# Patient Record
Sex: FEMALE | Race: WHITE | URBAN - METROPOLITAN AREA
[De-identification: names, ages, dates, MRNs, and addresses within clinical notes are randomized per-mention and may not be internally consistent; named-entity substitution may affect disease eponyms.]

---

## 2020-08-24 ENCOUNTER — TELEPHONE (OUTPATIENT)
Dept: ORTHOPEDIC SURGERY | Age: 25
End: 2020-08-24

## 2020-08-28 ENCOUNTER — OFFICE VISIT (OUTPATIENT)
Dept: ORTHOPEDIC SURGERY | Age: 25
End: 2020-08-28
Payer: COMMERCIAL

## 2020-08-28 VITALS — WEIGHT: 178 LBS | HEIGHT: 61 IN | TEMPERATURE: 98.7 F | BODY MASS INDEX: 33.61 KG/M2

## 2020-08-28 PROBLEM — M51.26 HERNIATION OF INTERVERTEBRAL DISC BETWEEN L4 AND L5: Status: ACTIVE | Noted: 2020-08-28

## 2020-08-28 PROBLEM — J45.20 MILD INTERMITTENT ASTHMA: Status: ACTIVE | Noted: 2017-08-08

## 2020-08-28 PROBLEM — M25.562 ACUTE PAIN OF LEFT KNEE: Status: ACTIVE | Noted: 2020-08-27

## 2020-08-28 PROBLEM — E03.8 SUBCLINICAL HYPOTHYROIDISM: Status: ACTIVE | Noted: 2020-02-11

## 2020-08-28 PROBLEM — Z80.41 FH: OVARIAN CANCER IN FIRST DEGREE RELATIVE: Status: ACTIVE | Noted: 2020-08-28

## 2020-08-28 PROBLEM — E66.9 OBESITY (BMI 35.0-39.9 WITHOUT COMORBIDITY): Status: ACTIVE | Noted: 2020-02-24

## 2020-08-28 PROBLEM — Z34.92 SECOND TRIMESTER PREGNANCY: Status: ACTIVE | Noted: 2020-06-30

## 2020-08-28 PROBLEM — J06.9 UPPER RESPIRATORY INFECTION: Status: ACTIVE | Noted: 2020-03-12

## 2020-08-28 PROBLEM — J32.0 CHRONIC MAXILLARY SINUSITIS: Status: ACTIVE | Noted: 2019-04-16

## 2020-08-28 PROBLEM — O99.210 OBESITY AFFECTING PREGNANCY, ANTEPARTUM: Status: ACTIVE | Noted: 2020-02-11

## 2020-08-28 PROBLEM — Z80.3: Status: ACTIVE | Noted: 2020-08-28

## 2020-08-28 PROBLEM — Z84.81 FAMILY HISTORY OF BRCA GENE POSITIVE: Status: ACTIVE | Noted: 2020-08-28

## 2020-08-28 PROCEDURE — E0135 WALKER FOLDING ADJUST/FIXED: HCPCS | Performed by: ORTHOPAEDIC SURGERY

## 2020-08-28 PROCEDURE — G8419 CALC BMI OUT NRM PARAM NOF/U: HCPCS | Performed by: ORTHOPAEDIC SURGERY

## 2020-08-28 PROCEDURE — G8427 DOCREV CUR MEDS BY ELIG CLIN: HCPCS | Performed by: ORTHOPAEDIC SURGERY

## 2020-08-28 PROCEDURE — 99204 OFFICE O/P NEW MOD 45 MIN: CPT | Performed by: ORTHOPAEDIC SURGERY

## 2020-08-28 PROCEDURE — 1036F TOBACCO NON-USER: CPT | Performed by: ORTHOPAEDIC SURGERY

## 2020-08-28 RX ORDER — FLUTICASONE PROPIONATE 50 MCG
2 SPRAY, SUSPENSION (ML) NASAL DAILY
COMMUNITY

## 2020-08-28 RX ORDER — ALBUTEROL SULFATE 90 UG/1
1 AEROSOL, METERED RESPIRATORY (INHALATION) EVERY 6 HOURS PRN
COMMUNITY
Start: 2019-01-23

## 2020-08-28 RX ORDER — ONDANSETRON 4 MG/1
4 TABLET, FILM COATED ORAL EVERY 8 HOURS PRN
COMMUNITY
Start: 2020-06-03 | End: 2021-06-03

## 2020-08-28 RX ORDER — ONDANSETRON 4 MG/1
TABLET, FILM COATED ORAL
COMMUNITY
Start: 2020-06-03

## 2020-08-28 RX ORDER — LORATADINE 10 MG/1
10 TABLET ORAL DAILY
COMMUNITY
Start: 2019-02-21

## 2020-08-28 RX ORDER — FAMOTIDINE 20 MG/1
20 TABLET, FILM COATED ORAL DAILY
COMMUNITY
Start: 2020-02-11 | End: 2021-02-10

## 2020-08-28 RX ORDER — FAMOTIDINE 20 MG/1
TABLET ORAL
COMMUNITY
Start: 2020-07-28

## 2020-08-28 RX ORDER — DIPHENHYDRAMINE HYDROCHLORIDE 25 MG/1
TABLET, CHEWABLE ORAL
COMMUNITY

## 2020-08-28 NOTE — PROGRESS NOTES
Never Smoker    Smokeless tobacco: Never Used   Substance and Sexual Activity    Alcohol use: Yes    Drug use: Never    Sexual activity: None   Lifestyle    Physical activity     Days per week: None     Minutes per session: None    Stress: None   Relationships    Social connections     Talks on phone: None     Gets together: None     Attends Scientologist service: None     Active member of club or organization: None     Attends meetings of clubs or organizations: None     Relationship status: None    Intimate partner violence     Fear of current or ex partner: None     Emotionally abused: None     Physically abused: None     Forced sexual activity: None   Other Topics Concern    None   Social History Narrative    None       Current Outpatient Medications   Medication Sig Dispense Refill    albuterol sulfate  (90 Base) MCG/ACT inhaler Inhale 1 puff into the lungs every 6 hours as needed      famotidine (PEPCID) 20 MG tablet Take 20 mg by mouth daily      loratadine (CLARITIN) 10 MG tablet Take 10 mg by mouth daily      ondansetron (ZOFRAN) 4 MG tablet Take 4 mg by mouth every 8 hours as needed      diphenhydrAMINE HCl, Sleep, (UNISOM SLEEPMELTS) 25 MG TBDP Take by mouth      SM ACID REDUCER MAX ST 20 MG tablet Take 1 tablet by mouth daily.  fluticasone (FLONASE) 50 MCG/ACT nasal spray 2 sprays by Nasal route daily      ondansetron (ZOFRAN) 4 MG tablet dissolve one tablet in your mouth every 8 hours as needed for Nausea. No current facility-administered medications for this visit. Allergies   Allergen Reactions    No Known Allergies        Vital signs:  Temp 98.7 °F (37.1 °C)   Ht 5' 1\" (1.549 m)   Wt 178 lb (80.7 kg)   BMI 33.63 kg/m²      Constitution: Patient cooperative with examination today. Well-developed, well-nourished in no acute distress. Neuro: Alert & oriented x 3,  no focal motor or sensory deficits noted.    Eyes: sclera clear, atraumatic  Ears: Normal believe she may have a meniscus tear but due to being 9 months pregnant, we are limited with the treatment we are able to provide at this time. We will provide her with an ace wrap to use for comfort as well as a walker to use with ambulation. She wishes to proceed with this entity. She can use ice and Tylenol as needed for her pain and inflammation. I will see her back for further treatment following the delivery of her baby if symptoms persist or worsen. Iglesia Richey is in agreement with this plan. All questions were answered to patient's satisfaction and was encouraged to call with any further questions. Orders Placed This Encounter   Procedures    Walker Medline     Patient was prescribed a Medline Walker. This mobility device is required for the following reasons:    Patient has mobility limitations that significantly impair their ability to participate in one or more mobility related activities of daily living. The patient is able to safely use the mobility device. Functional mobility deficit can be sufficiently resolved with the use of this device. Verbal and written instructions for the use of and application of this item were provided. The patient was educated and fit by a healthcare professional with expert knowledge and specialization in brace application while under the direct supervision of the treating physician. They were instructed to contact the office immediately should the equipment result in increased pain, decreased sensation, increased swelling or worsening of the condition. Emily Liao ATC, yudy scribing for and in the presence of Dr. Сергей Fontenot. 08/28/20 1:40 PM Wendy Negrete ATC            I personally reviewed the patient's pain scale, review of systems, family history, social history, past medical history, allergies and medications. Review of systems was collected today, reviewed and is included in the medical record.   It is available under the media tab.    I personally performed the services described in this documentation and scribed by Keiry Cohen ATC. Nikolas Ly MD  Sports Medicine, Arthroscopic Knee and Shoulder Surgery    This dictation was performed with a verbal recognition program [facility]) and it was checked for errors. It is possible that there are still dictated errors within this office note. If so, please bring any errors to my attention for an addendum. All efforts were made to ensure that this office note is accurate.

## 2020-08-28 NOTE — LETTER
[facility] [address] [address]  Phone: [phone]  Fax: [phone]    Penny Eubanks MD        August 28, 2020     Patient: Nasir Clemente   YOB: 1995   Date of Visit: 8/28/2020       To Whom it May Concern:    Nasir Clemente was seen in my clinic on 8/28/2020. She should remain off work until cleared by physician. Knee is locking and is unable to have treatment due to pregnancy. Re-eval after delivery. If you have any questions or concerns, please don't hesitate to call.     Sincerely,         Penny Eubakns MD

## 2020-09-10 ENCOUNTER — TELEPHONE (OUTPATIENT)
Dept: ORTHOPEDIC SURGERY | Age: 25
End: 2020-09-10

## 2020-10-06 DIAGNOSIS — M25.562 LEFT KNEE PAIN, UNSPECIFIED CHRONICITY: Primary | ICD-10-CM

## 2020-10-15 ENCOUNTER — TELEPHONE (OUTPATIENT)
Dept: ORTHOPEDIC SURGERY | Age: 25
End: 2020-10-15

## 2020-10-16 ENCOUNTER — TELEPHONE (OUTPATIENT)
Dept: ORTHOPEDIC SURGERY | Age: 25
End: 2020-10-16

## 2020-10-21 ENCOUNTER — OFFICE VISIT (OUTPATIENT)
Dept: ORTHOPEDIC SURGERY | Age: 25
End: 2020-10-21
Payer: COMMERCIAL

## 2020-10-21 VITALS — TEMPERATURE: 98.2 F | HEIGHT: 61 IN | BODY MASS INDEX: 31.53 KG/M2 | WEIGHT: 167 LBS

## 2020-10-21 PROCEDURE — G8417 CALC BMI ABV UP PARAM F/U: HCPCS | Performed by: ORTHOPAEDIC SURGERY

## 2020-10-21 PROCEDURE — 99213 OFFICE O/P EST LOW 20 MIN: CPT | Performed by: ORTHOPAEDIC SURGERY

## 2020-10-21 PROCEDURE — G8427 DOCREV CUR MEDS BY ELIG CLIN: HCPCS | Performed by: ORTHOPAEDIC SURGERY

## 2020-10-21 PROCEDURE — 1036F TOBACCO NON-USER: CPT | Performed by: ORTHOPAEDIC SURGERY

## 2020-10-21 PROCEDURE — G8484 FLU IMMUNIZE NO ADMIN: HCPCS | Performed by: ORTHOPAEDIC SURGERY

## 2020-10-21 NOTE — LETTER
PRESCRIPTION FOR PHYSICAL THERAPY    Patient Name: Flor Raymundo MRN: <[id number]>  DOS: 10/21/2020   Diagnosis:   1. Hyperextension injury of left knee, initial encounter                           Goal:  []Decrease Pain and/or Swelling [x]Increase ROM and/or Flexibility     [x]Increase Function                           [x]Increase Strength and/or Endurance   []Other   Evaluation:  [x]Evaluation and Treatment []KT-1000   []Isokinetic Exam   []Preoperative Eval    Recommended Modalities:  [x]Modalities of Choice      []HCVS            []Electrical Stimulation     [] Remove Dressing  []Ultrasound        []TENS/TNS    [] Lumbar Traction           [] Cervical Traction   []Phonophoresis         []Hot Pack/Cold Pack   []PT Treatment, Unlisted []Other:  Therapeutic Exercises:    []Isometrics    [x]Range of Motion []Progressive Exer. []Balance Coordination   [x]Flexibility  []ROM Limited  []Total Hip Replacement   []Passive  []ROM Full   []Total Knee Replacement  []Active Assisted    []Shoulder Impingement Prog  []Active   []Tennis Elbow Program   []Capsular Shift Regular        []Isokinetics      []Spine Program   []Straight Leg Raises  [] Gait    []Fixation                   [] Supine                                              [] Running   [] Extension   [] Prone   [] Throwing   [] Stabilization   [] AB    [] Swiss Christina Letters   [] AD      [] Spine Eval   [] Cervical Eval  [] Conditioning   [] Lumbar  [] Stationary Bike   [] Winder Track   [] Lumbar Exer.  [] Stairmaster         [] Treadmill   [] Functional Cap [] Aquatic Prog.      [] Return to work    Treatment Program:  Frequency: [] 1x  [x] 2x  [] 3x  [] 4x  [] 5x week/month  Duration: [] 1  [] 2  [] 3  [x] 4  [] 5 week/month  Weight Bearing: [] Non  [] 1/4  [] 1/2  [] 3/4  [] Full  ROM: [] Restricted  [] Full  [] Follow established:        [] Other:

## 2020-10-21 NOTE — PROGRESS NOTES
intact to light-touch. Special tests: Negative Stephon sign. Right  knee exam    Gait: No use of assistive devices. No antalgic gait. Alignment: normal alignment. Inspection/skin: Skin is intact without erythema or ecchymosis. No gross deformity. Palpation: no crepitus. no joint line tenderness present. Range of Motion: 3 to 125 degrees of extension    Strength: Normal quadriceps development. Effusion: No effusion or swelling present. Ligamentous stability: No cruciate or collateral ligament instability. Neurologic and vascular: Skin is warm and well-perfused. Sensation is intact to light-touch. Special tests: Negative Stephon sign. Radiology:     MRI LEFT KNEE 10/15/2020      IMPRESSION:         1.  No significant abnormality is noted. Specifically, no evidence of meniscal tear. Assessment : 31 y.o female with a hyperextension injury to the left knee. No evidence of meniscal or ligament tears. Impression:  Encounter Diagnosis   Name Primary?  Hyperextension injury of left knee, initial encounter Yes       Office Procedures:  Orders Placed This Encounter   Procedures    External Referral To Physical Therapy     Referral Priority:   Routine     Referral Type:   Eval and Treat     Referral Reason:   Specialty Services Required     Requested Specialty:   Physical Therapy     Number of Visits Requested:   1         Plan: Pertinent imaging was reviewed. The etiology, natural history, and treatment options for the disorder were discussed. The roles of activity medication, antiinflammatories, injections, bracing, physical therapy, and surgical interventions were all described to the patient and questions were answered. We believe patient is a candidate for physical therapy for flexibility and strengthening exercises. A referral has been placed in the system. She can schedule at her convenience. Patient will follow up with us in 1 month to discuss progress. Flor Raymundo is in agreement with this plan. All questions were answered to patient's satisfaction and was encouraged to call with any further questions. Lane Mtz CMA, am scribing for and in the presence of Dr. Chip Rosario MD.    10/21/20 5:44 PM  Claudene Patch, CMA             I personally reviewed the patient's pain scale, review of systems, family history, social history, past medical history, allergies and medications. Review of systems was collected today, reviewed and is included in the medical record. It is available under the media tab. I personally performed the services described in this documentation and scribed by Claudene Patch, CMA Adelle Many, MD  Sports Medicine, Arthroscopic Knee and Shoulder Surgery    This dictation was performed with a verbal recognition program [facility]) and it was checked for errors. It is possible that there are still dictated errors within this office note. If so, please bring any errors to my attention for an addendum. All efforts were made to ensure that this office note is accurate.